# Patient Record
(demographics unavailable — no encounter records)

---

## 2024-12-10 NOTE — PHYSICAL EXAM
[Normal Sclera/Conjunctiva] : normal sclera/conjunctiva [PERRL] : pupils equal round and reactive to light [EOMI] : extraocular movements intact [Normal Outer Ear/Nose] : the outer ears and nose were normal in appearance [Normal Oropharynx] : the oropharynx was normal [No JVD] : no jugular venous distention [No Lymphadenopathy] : no lymphadenopathy [Thyroid Normal, No Nodules] : the thyroid was normal and there were no nodules present [No Carotid Bruits] : no carotid bruits [No Abdominal Bruit] : a ~M bruit was not heard ~T in the abdomen [No Varicosities] : no varicosities [No Edema] : there was no peripheral edema [No Palpable Aorta] : no palpable aorta [Normal Appearance] : normal in appearance [No Masses] : no palpable masses [Normal] : no posterior cervical lymphadenopathy and no anterior cervical lymphadenopathy [No CVA Tenderness] : no CVA  tenderness [No Spinal Tenderness] : no spinal tenderness [No Rash] : no rash [Coordination Grossly Intact] : coordination grossly intact [No Focal Deficits] : no focal deficits [Normal Gait] : normal gait [Normal Affect] : the affect was normal [Normal Insight/Judgement] : insight and judgment were intact

## 2024-12-10 NOTE — HEALTH RISK ASSESSMENT
[Little interest or pleasure doing things] : 1) Little interest or pleasure doing things [Feeling down, depressed, or hopeless] : 2) Feeling down, depressed, or hopeless [0] : 2) Feeling down, depressed, or hopeless: Not at all (0) [PHQ-2 Negative - No further assessment needed] : PHQ-2 Negative - No further assessment needed [Patient reported colonoscopy was abnormal] : Patient reported colonoscopy was abnormal [[id number]] : 0 [ColonoscopyDate] : 2023 [ColonoscopyComments] : Positive polyps, due in 3 years

## 2024-12-10 NOTE — ASSESSMENT
[FreeTextEntry1] : Stable Continue present medication Advise regular aerobic exercise, weight reduction and heart healthy diet Check routine blood work Patient is up-to-date on influenza vaccination

## 2024-12-10 NOTE — HISTORY OF PRESENT ILLNESS
[de-identified] : Patient with history of hyperlipidemia, hypertension, and coronary artery disease, right bundle branch block presents to office for routine follow-up and annual wellness exam. Overall he feels well and denies any exertional chest pain, shortness of breath, palpitations and states compliance with all medication. He had a recent Cardiologic evaluation.

## 2025-03-10 NOTE — PHYSICAL EXAM
[General Appearance - Well Developed] : well developed [Normal Appearance] : normal appearance [Well Groomed] : well groomed [General Appearance - Well Nourished] : well nourished [No Deformities] : no deformities [General Appearance - In No Acute Distress] : no acute distress [Eyelids - No Xanthelasma] : the eyelids demonstrated no xanthelasmas [Normal Oral Mucosa] : normal oral mucosa [No Oral Pallor] : no oral pallor [No Oral Cyanosis] : no oral cyanosis [Normal Jugular Venous A Waves Present] : normal jugular venous A waves present [Normal Jugular Venous V Waves Present] : normal jugular venous V waves present [No Jugular Venous Noel A Waves] : no jugular venous noel A waves [Respiration, Rhythm And Depth] : normal respiratory rhythm and effort [Exaggerated Use Of Accessory Muscles For Inspiration] : no accessory muscle use [Auscultation Breath Sounds / Voice Sounds] : lungs were clear to auscultation bilaterally [Abdomen Soft] : soft [Abdomen Tenderness] : non-tender [Abdomen Mass (___ Cm)] : no abdominal mass palpated [Gait - Sufficient For Exercise Testing] : the gait was sufficient for exercise testing [Abnormal Walk] : normal gait [Nail Clubbing] : no clubbing of the fingernails [Cyanosis, Localized] : no localized cyanosis [Petechial Hemorrhages (___cm)] : no petechial hemorrhages [Skin Color & Pigmentation] : normal skin color and pigmentation [No Venous Stasis] : no venous stasis [] : no rash [Skin Lesions] : no skin lesions [No Skin Ulcers] : no skin ulcer [No Xanthoma] : no  xanthoma was observed [Oriented To Time, Place, And Person] : oriented to person, place, and time [Affect] : the affect was normal [Mood] : the mood was normal [No Anxiety] : not feeling anxious [Normal] : normal [Normal Rate] : normal [Rhythm Regular] : regular [Normal S1] : normal S1 [Normal S2] : normal S2 [2+] : left 2+ [1+] : left 1+ [No Pitting Edema] : no pitting edema present [Well Developed] : well developed [Well Nourished] : well nourished [No Acute Distress] : no acute distress [Normal Conjunctiva] : normal conjunctiva [Normal Venous Pressure] : normal venous pressure [No Carotid Bruit] : no carotid bruit [Normal S1, S2] : normal S1, S2 [No Murmur] : no murmur [No Gallop] : no gallop [No Rub] : no rub [Clear Lung Fields] : clear lung fields [Good Air Entry] : good air entry [No Respiratory Distress] : no respiratory distress  [Soft] : abdomen soft [Non Tender] : non-tender [No Masses/organomegaly] : no masses/organomegaly [Normal Bowel Sounds] : normal bowel sounds [Normal Gait] : normal gait [No Edema] : no edema [No Cyanosis] : no cyanosis [No Clubbing] : no clubbing [No Varicosities] : no varicosities [No Rash] : no rash [Moves all extremities] : moves all extremities [No Focal Deficits] : no focal deficits [Normal Speech] : normal speech [Alert and Oriented] : alert and oriented [Normal memory] : normal memory [S3] : no S3 [S4] : no S4 [Right Carotid Bruit] : no bruit heard over the right carotid [Left Carotid Bruit] : no bruit heard over the left carotid [Right Femoral Bruit] : no bruit heard over the right femoral artery [Left Femoral Bruit] : no bruit heard over the left femoral artery [Bruit] : no bruit heard

## 2025-03-10 NOTE — HISTORY OF PRESENT ILLNESS
leg/Left: [FreeTextEntry1] : Bladimir Min presented to the office today for a followup cardiovascular evaluation. He was last seen in the office 6 months ago.  He is now 76 years old, with a history of coronary artery disease. He is status post PCI of the circumflex in 2009. He has a history of hypercholesterolemia, unfortunately intolerant to statins, including simvastatin, Lipitor and Crestor and Livalo. He is on Repatha and doing well with that.  He has had mild aortic insufficiency with aortic valve sclerosis, along with a normal ejection fraction of 65%, based on his most recent echocardiogram from August, 2020.  He has had mild to moderate carotid atherosclerosis.  Bladimir presents to the office today feeling well. He reports no chest discomfort or dyspnea with activity that would suggest angina. He continues to exercise several times a week, without any significant limitation.  He reports no orthopnea, PND or lower extremity edema. He denies palpitations, as well as dizziness and syncope.    He reports that his blood pressure has been higher, generally in the 130s up to 140s at times.  His cholesterol have been well-controlled.  He has been seeing hematology for his elevated hemoglobin, and was reassured. He was told that if anything was needed, he would require phlebotomy, but for now all is stable, or even improved. He has been getting paresthesias of his feet when he wakes up in the AM, or when he lies down.  He had edema of the legs on amlodipine 10, and is now on 5 mg daily.

## 2025-03-10 NOTE — REASON FOR VISIT
[CV Risk Factors and Non-Cardiac Disease] : CV risk factors and non-cardiac disease [Follow-Up - Clinic] : a clinic follow-up of [Coronary Artery Disease] : coronary artery disease [Hyperlipidemia] : hyperlipidemia [Hypertension] : hypertension

## 2025-03-10 NOTE — DISCUSSION/SUMMARY
[FreeTextEntry1] : Bladimir has been feeling well from a cardiovascular perspective.   He arrives in no acute distress.  He is euvolemic on exam.  ECG illustrates sinus rhythm, known RBBB.  His blood pressure is a little bit higher.  He will continue the lower dose of amlodipine, but he will start losartan 50 mg daily.  He will have blood work done in about 2 weeks, and he will keep an eye on his blood pressure for me.  He will call us if his blood pressure is not sufficiently controlled.    From the perspective of his coronary artery disease, he is status post remote PCI in 2009.  He is managed with lipid-lowering, as well as single antiplatelet therapy, ASA 81mg.    His updated echocardiogram demonstrated a normal  LVEF with mil AI.  Nuclear stress testing demonstrated normal perfusion. Carotid ultrasound demonstrated mild atherosclerotic disease bilaterally, from 2019.  Carotid ultrasound was performed June 11, 2024.  This revealed mild atherosclerosis.  He will continue Repatha for lipid management.      His rhythm remains a sinus bradycardia with a right bundle branch block.  He has no symptoms of pauses, or more significant heart block of any kind.  He will followup again in 6 months, sooner if any questions or concerns arise.  [EKG obtained to assist in diagnosis and management of assessed problem(s)] : EKG obtained to assist in diagnosis and management of assessed problem(s)

## 2025-03-10 NOTE — CARDIOLOGY SUMMARY
[___] : [unfilled] [de-identified] : sinus bradycardia RBBB [de-identified] : 5/2023 Nuclear 10 METS nml perfusion elevated B/P [de-identified] : 6/2023 LVEF 66% mild mac mild MR, mild AI NMl LV/RV

## 2025-04-17 NOTE — HISTORY OF PRESENT ILLNESS
[FreeTextEntry1] : Routine follow-up [de-identified] : Patient with history of hypertension, hyperlipidemia, coronary artery disease presents to office for routine follow-up Overall he feels well and denies any exertional chest pain, shortness of breath, palpitations and states compliance with all medication. He does complain of some pins-and-needles involving his feet that has been intermittent for several months. He denies any weakness. He does complain of occasional chronic low back pain.  He states that many years ago he had similar symptoms and was noted to be at the lower levels of normal B12

## 2025-04-17 NOTE — PHYSICAL EXAM
[Normal Sclera/Conjunctiva] : normal sclera/conjunctiva [No Lymphadenopathy] : no lymphadenopathy [Thyroid Normal, No Nodules] : the thyroid was normal and there were no nodules present [No Carotid Bruits] : no carotid bruits [No Abdominal Bruit] : a ~M bruit was not heard ~T in the abdomen [No Varicosities] : no varicosities [No Edema] : there was no peripheral edema [No Palpable Aorta] : no palpable aorta [Normal] : soft, non-tender, non-distended, no masses palpated, no HSM and normal bowel sounds [Normal Supraclavicular Nodes] : no supraclavicular lymphadenopathy [Normal Posterior Cervical Nodes] : no posterior cervical lymphadenopathy [Normal Anterior Cervical Nodes] : no anterior cervical lymphadenopathy [No CVA Tenderness] : no CVA  tenderness [No Rash] : no rash [Coordination Grossly Intact] : coordination grossly intact [No Focal Deficits] : no focal deficits [Normal Gait] : normal gait [Normal Insight/Judgement] : insight and judgment were intact

## 2025-06-25 NOTE — HISTORY OF PRESENT ILLNESS
[FreeTextEntry1] : Routine follow-up [de-identified] : Patient with history of hypertension, hyperlipidemia and peripheral neuropathy presents to office for routine blood work. Patient had seen neurologist who recommended additional blood work. Patient denies any exertional chest pain, shortness of breath, palpitation

## 2025-06-25 NOTE — PHYSICAL EXAM
[No Acute Distress] : no acute distress [No Lymphadenopathy] : no lymphadenopathy [Thyroid Normal, No Nodules] : the thyroid was normal and there were no nodules present [No Carotid Bruits] : no carotid bruits [No Abdominal Bruit] : a ~M bruit was not heard ~T in the abdomen [No Edema] : there was no peripheral edema [No Palpable Aorta] : no palpable aorta [Normal] : soft, non-tender, non-distended, no masses palpated, no HSM and normal bowel sounds [No CVA Tenderness] : no CVA  tenderness [Coordination Grossly Intact] : coordination grossly intact [No Focal Deficits] : no focal deficits [Normal Gait] : normal gait [Normal Insight/Judgement] : insight and judgment were intact

## 2025-06-25 NOTE — ASSESSMENT
[FreeTextEntry1] : Stable Continue present medication Advise regular aerobic activity and heart healthy diet
1-2 cups/cans per day